# Patient Record
Sex: MALE | Race: BLACK OR AFRICAN AMERICAN
[De-identification: names, ages, dates, MRNs, and addresses within clinical notes are randomized per-mention and may not be internally consistent; named-entity substitution may affect disease eponyms.]

---
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
## 2021-02-16 ENCOUNTER — CLAIMS CREATED FROM THE CLAIM WINDOW (OUTPATIENT)
Dept: URBAN - METROPOLITAN AREA CLINIC 118 | Facility: CLINIC | Age: 21
End: 2021-02-16
Payer: COMMERCIAL

## 2021-02-16 ENCOUNTER — LAB OUTSIDE AN ENCOUNTER (OUTPATIENT)
Dept: URBAN - METROPOLITAN AREA CLINIC 118 | Facility: CLINIC | Age: 21
End: 2021-02-16

## 2021-02-16 DIAGNOSIS — R19.8 CHANGE IN BOWEL FUNCTION: ICD-10-CM

## 2021-02-16 DIAGNOSIS — R10.9 ABDOMINAL PAIN: ICD-10-CM

## 2021-02-16 PROCEDURE — 99214 OFFICE O/P EST MOD 30 MIN: CPT | Performed by: INTERNAL MEDICINE

## 2021-02-16 RX ORDER — PANTOPRAZOLE SODIUM 40 MG/1
1 TABLET TABLET, DELAYED RELEASE ORAL ONCE A DAY
Qty: 30 TABLET | Refills: 5 | Status: DISCONTINUED | COMMUNITY
Start: 2020-08-27

## 2021-02-16 NOTE — HPI-TODAY'S VISIT:
pt obese male reports 6 day lower abdominal pain. pt reports intermittent mild pain but symptoms started and worsened over those days. Mild loose stools w/o blood. Reports no recent diet or med changes. Denies significant UGI symptoms except mild nausea, denies gerd, dysphagia. No nsaids/asa. pt went to ED, no ct scan done but told possible diverticulitis, started on Cipro/Flagyl/Tramadol yeasterday and referred to GI. No other complaints. Denies family h/o of IBD

## 2021-03-02 ENCOUNTER — CLAIMS CREATED FROM THE CLAIM WINDOW (OUTPATIENT)
Dept: URBAN - METROPOLITAN AREA CLINIC 118 | Facility: CLINIC | Age: 21
End: 2021-03-02
Payer: COMMERCIAL

## 2021-03-02 ENCOUNTER — DASHBOARD ENCOUNTERS (OUTPATIENT)
Age: 21
End: 2021-03-02

## 2021-03-02 ENCOUNTER — LAB OUTSIDE AN ENCOUNTER (OUTPATIENT)
Dept: URBAN - METROPOLITAN AREA CLINIC 118 | Facility: CLINIC | Age: 21
End: 2021-03-02

## 2021-03-02 DIAGNOSIS — R10.13 EPIGASTRIC ABDOMINAL PAIN: ICD-10-CM

## 2021-03-02 DIAGNOSIS — R19.4 CHANGE IN BOWEL HABIT: ICD-10-CM

## 2021-03-02 DIAGNOSIS — K62.5 RECTAL BLEEDING: ICD-10-CM

## 2021-03-02 DIAGNOSIS — R11.0 NAUSEA: ICD-10-CM

## 2021-03-02 DIAGNOSIS — R10.84 GENERALIZED ABDOMINAL PAIN: ICD-10-CM

## 2021-03-02 PROBLEM — 422587007: Status: ACTIVE | Noted: 2021-03-02

## 2021-03-02 PROCEDURE — 99204 OFFICE O/P NEW MOD 45 MIN: CPT | Performed by: INTERNAL MEDICINE

## 2021-03-02 PROCEDURE — 99214 OFFICE O/P EST MOD 30 MIN: CPT | Performed by: INTERNAL MEDICINE

## 2021-03-02 RX ORDER — PANTOPRAZOLE SODIUM 40 MG/1
1 TABLET TABLET, DELAYED RELEASE ORAL ONCE A DAY
Qty: 30 | Refills: 0 | OUTPATIENT
Start: 2021-03-02

## 2021-03-02 NOTE — HPI-TODAY'S VISIT:
20 yo M presents as [facility] ED f/u for abd pain with mom. Pt went to ED on 2/25/2021 for epigastric abd pain x2 weeks, worse after eating solid foods. He also reported BRBPR with every BM. Onset of sx x1 year. He complains of generalized abd pain, nausea, lower abd pain with every BM, bright red blood filling toilet bowl. CT Abd/Pelvis at ED diverticulosis, no evidence of diverticulitis, hepatomegaly, hepatic steatosis. Normal LFTs. Mild leukocytosis 14, Hgb 12.9. He denies NSAIDs use, heartburn, regurgitation, melena, wt loss, vomiting, fever, chills, night sweats, mouth ulcers, joint pain, skin changes. BMs usually QD alternating b/w loose and hard stool, occ BMs q2-3 days. FH of colon cancer in grandmother/great-grandmother. No FH of gastric cancer, IBD. Last seen by Dr. Durand on 2/16/21 for similar sx. Recommendation for colonoscopy with continuation of sx.

## 2021-03-07 LAB
H PYLORI, IGM ABS: <9
H. PYLORI, IGA ABS: 10.6
H. PYLORI, IGG ABS: 2.11

## 2021-03-08 ENCOUNTER — TELEPHONE ENCOUNTER (OUTPATIENT)
Dept: URBAN - METROPOLITAN AREA CLINIC 118 | Facility: CLINIC | Age: 21
End: 2021-03-08

## 2021-03-08 RX ORDER — PANTOPRAZOLE SODIUM 40 MG/1
1 TABLET TABLET, DELAYED RELEASE ORAL TWICE A DAY
Qty: 28 TABLET | Refills: 0 | OUTPATIENT
Start: 2021-03-08

## 2021-03-08 RX ORDER — AMOXICILLIN 500 MG/1
2 CAPSULES CAPSULE ORAL TWICE A DAY
Qty: 56 CAPSULE | Refills: 0 | OUTPATIENT
Start: 2021-03-08 | End: 2021-03-22

## 2021-03-08 RX ORDER — PANTOPRAZOLE SODIUM 40 MG/1
1 TABLET TABLET, DELAYED RELEASE ORAL ONCE A DAY
Qty: 30 | Refills: 0 | Status: ACTIVE | COMMUNITY
Start: 2021-03-02

## 2021-03-08 RX ORDER — CLARITHROMYCIN 500 MG/1
1 TABLET TABLET, FILM COATED ORAL
Qty: 28 TABLET | Refills: 0 | OUTPATIENT
Start: 2021-03-08 | End: 2021-03-22

## 2021-03-29 PROBLEM — 79922009: Status: ACTIVE | Noted: 2021-03-02

## 2021-03-29 PROBLEM — 129851009: Status: ACTIVE | Noted: 2021-03-02

## 2021-03-29 PROBLEM — 12063002: Status: ACTIVE | Noted: 2021-03-02

## 2021-03-29 PROBLEM — 102614006: Status: ACTIVE | Noted: 2021-03-02

## 2021-04-01 ENCOUNTER — OFFICE VISIT (OUTPATIENT)
Dept: URBAN - METROPOLITAN AREA MEDICAL CENTER 16 | Facility: MEDICAL CENTER | Age: 21
End: 2021-04-01
Payer: COMMERCIAL

## 2021-04-01 DIAGNOSIS — K63.89 BACTERIAL OVERGROWTH SYNDROME: ICD-10-CM

## 2021-04-01 DIAGNOSIS — K62.5 ANAL BLEEDING: ICD-10-CM

## 2021-04-01 PROCEDURE — 45380 COLONOSCOPY AND BIOPSY: CPT | Performed by: INTERNAL MEDICINE

## 2021-04-19 ENCOUNTER — OFFICE VISIT (OUTPATIENT)
Dept: URBAN - METROPOLITAN AREA CLINIC 118 | Facility: CLINIC | Age: 21
End: 2021-04-19

## 2021-04-19 RX ORDER — PANTOPRAZOLE SODIUM 40 MG/1
1 TABLET TABLET, DELAYED RELEASE ORAL TWICE A DAY
Qty: 28 TABLET | Refills: 0 | Status: ACTIVE | COMMUNITY
Start: 2021-03-08

## 2021-04-19 RX ORDER — PANTOPRAZOLE SODIUM 40 MG/1
1 TABLET TABLET, DELAYED RELEASE ORAL ONCE A DAY
Qty: 30 | Refills: 0 | Status: ACTIVE | COMMUNITY
Start: 2021-03-02

## 2021-04-19 NOTE — HPI-TODAY'S VISIT:
21-year-old male last seen on 3/2/2020 presents for follow-up of epigastric pain, nausea, generalized abdominal pain, change in bowel habits with rectal bleeding.  Colonoscopy on 4/1/2021 with Dr. Durand with evidence colonic mucosa with erosion and lymphoid aggregate, no colitis.  Otherwise normal.  H. pylori positive, treated with triple therapy.

## 2021-05-14 ENCOUNTER — OFFICE VISIT (OUTPATIENT)
Dept: URBAN - METROPOLITAN AREA CLINIC 118 | Facility: CLINIC | Age: 21
End: 2021-05-14